# Patient Record
Sex: MALE | Race: WHITE | Employment: FULL TIME | URBAN - METROPOLITAN AREA
[De-identification: names, ages, dates, MRNs, and addresses within clinical notes are randomized per-mention and may not be internally consistent; named-entity substitution may affect disease eponyms.]

---

## 2019-07-29 ENCOUNTER — OFFICE VISIT (OUTPATIENT)
Dept: FAMILY MEDICINE CLINIC | Facility: CLINIC | Age: 34
End: 2019-07-29
Payer: COMMERCIAL

## 2019-07-29 VITALS
OXYGEN SATURATION: 98 % | SYSTOLIC BLOOD PRESSURE: 118 MMHG | HEART RATE: 74 BPM | BODY MASS INDEX: 24.38 KG/M2 | TEMPERATURE: 97 F | WEIGHT: 180 LBS | HEIGHT: 72 IN | DIASTOLIC BLOOD PRESSURE: 80 MMHG | RESPIRATION RATE: 20 BRPM

## 2019-07-29 DIAGNOSIS — J02.9 PHARYNGITIS, UNSPECIFIED ETIOLOGY: Primary | ICD-10-CM

## 2019-07-29 LAB
CONTROL LINE PRESENT WITH A CLEAR BACKGROUND (YES/NO): YES YES/NO
STREP GRP A CUL-SCR: NEGATIVE

## 2019-07-29 PROCEDURE — 87880 STREP A ASSAY W/OPTIC: CPT | Performed by: NURSE PRACTITIONER

## 2019-07-29 PROCEDURE — 99203 OFFICE O/P NEW LOW 30 MIN: CPT | Performed by: NURSE PRACTITIONER

## 2019-07-29 NOTE — PROGRESS NOTES
CHIEF COMPLAINT:   Patient presents with:  Sore Throat        HPI:   Warren Alaniz is a 29year old male presents to clinic with complaint of sore throat. Patient has had 5 days. Symptoms have been waxing and waning since onset.   Patient reports following submandibular lymphadenopathy. No posterior cervical or occipital lymphadenopathy.     Recent Results (from the past 24 hour(s))   STREP A ASSAY W/OPTIC    Collection Time: 07/29/19  6:06 PM   Result Value Ref Range    Strep Grp A Screen Negative Negative

## 2020-01-09 PROBLEM — J02.9 ACUTE PHARYNGITIS: Status: RESOLVED | Noted: 2019-07-29 | Resolved: 2020-01-09
